# Patient Record
Sex: FEMALE | Race: WHITE
[De-identification: names, ages, dates, MRNs, and addresses within clinical notes are randomized per-mention and may not be internally consistent; named-entity substitution may affect disease eponyms.]

---

## 2018-01-16 ENCOUNTER — HOSPITAL ENCOUNTER (OUTPATIENT)
Dept: HOSPITAL 45 - C.MAMM | Age: 53
Discharge: HOME | End: 2018-01-16
Attending: INTERNAL MEDICINE
Payer: COMMERCIAL

## 2018-01-16 ENCOUNTER — HOSPITAL ENCOUNTER (OUTPATIENT)
Dept: HOSPITAL 45 - C.CTS | Age: 53
Discharge: HOME | End: 2018-01-16
Attending: INTERNAL MEDICINE
Payer: COMMERCIAL

## 2018-01-16 DIAGNOSIS — C50.311: Primary | ICD-10-CM

## 2018-01-16 DIAGNOSIS — D68.2: Primary | ICD-10-CM

## 2018-01-16 DIAGNOSIS — D68.2: ICD-10-CM

## 2018-01-16 DIAGNOSIS — C50.311: ICD-10-CM

## 2018-01-16 DIAGNOSIS — M51.36: ICD-10-CM

## 2018-01-16 DIAGNOSIS — M41.86: ICD-10-CM

## 2018-01-16 DIAGNOSIS — Z98.890: ICD-10-CM

## 2018-01-16 NOTE — DIAGNOSTIC IMAGING REPORT
LUMBAR SPINE WITH



CT DOSE: 663.24 mGy.cm



CLINICAL HISTORY: 52 years-old Female with HEREDITARY DEFICIENCY OF OTHER

CLOTTING FORMS,.  Patient reportedly had an abnormal bone scan which showed

uptake at the L4 level. That study is not available for comparison at time of

dictation and was likely obtained from an outside facility.



TECHNIQUE:  Multiple axial CT images of the lumbar spine were obtained following

the intravenous administration of 93 mL Optiray 320.  Coronal and sagittal

reformats were generated from the axial data set and submitted for review.  A

dose lowering technique was utilized adhering to the principles of ALARA.



COMPARISON: Spot fluoroscopic images of the lumbar spine 1/3/2011.



FINDINGS: 



There are 5 lumbar-type vertebral bodies segments present. There is no acute

fracture or subluxation identified. There is 12 degrees convex left curvature of

the lumbar spine measured from L2-L4. Prior posterior decompression with

interbody leo and screw fusion and discectomy at L4-L5. No evidence of hardware

fracture or loosening. The left pedicle screw at L5 extends laterally outside of

the vertebral body nicely seen on image 26 series 200. Sacrum appears intact.

Severe intervertebral disc space narrowing is present at L3-L4. Multilevel

endplate spurring. 2 mm retrolisthesis of L2 on L3 is noted. No suspicious bone

lesions identified.



No acute abnormality identified within the intra-abdominal, intrapelvic or

paraspinal structures.



T11-12:  No evidence of disc bulge or central canal stenosis is seen.  The

neural foramina appear patent bilaterally. 



T12-L1:  No evidence of disc bulge or central canal stenosis is seen.  The

neural foramina appear patent bilaterally. 



L1-L2:  No evidence of disc bulge or central canal stenosis is seen.  The neural

foramina appear patent bilaterally. 



L2-L3:  Moderate intervertebral disc space narrowing with 2 mm retrolisthesis L2

on L3 which is likely degenerative. Mild spondylitic spurring is also noted in

conjunction with circumferential annular disc bulge, ligamentum flavum

thickening and mild facet arthrosis. There is flattening of the ventral thecal

sac without significant central canal stenosis. Neuroforamina also appear

patent.



L3-L4:  Severe intervertebral disc space narrowing with minimal posterior

spondylitic spurring and broad-based posterior disc bulge with moderate facet

arthrosis. No definite central canal narrowing. There is mild to moderate left

and moderate right foraminal narrowing.



L4-L5:  Postoperative changes at this level as above. Posterior spondylitic

spurring flattens the ventral thecal sac. No significant foraminal narrowing.



L5-S1:  Small broad-based posterior disc bulge with spondylitic spurring and

mild facet arthrosis. Mild right and mild to moderate left foraminal narrowing

without significant central canal stenosis.





IMPRESSION:  

1. No suspicious bone lesions identified at the L4 level to correlate with

reported abnormality seen on comparison bone scan.

2. Postoperative changes from prior laminectomy with posterior interbody leo and

screw fusion and discectomy at L4-L5. The left pedicle screw at L5 extends

laterally outside of the vertebral body as above. No evidence of hardware

fracture or loosening.

3. 12 degrees levoscoliosis of the lumbar spine.

4. Multilevel discogenic degenerative changes as detailed above including severe

intervertebral disc space narrowing at L3-L4.







The above report was generated using voice recognition software. It may contain

grammatical, syntax or spelling errors.







Electronically signed by:  Juan A Streeter M.D.

1/16/2018 2:04 PM



Dictated Date/Time:  1/16/2018 12:58 PM